# Patient Record
Sex: MALE | Race: WHITE
[De-identification: names, ages, dates, MRNs, and addresses within clinical notes are randomized per-mention and may not be internally consistent; named-entity substitution may affect disease eponyms.]

---

## 2021-07-27 ENCOUNTER — HOSPITAL ENCOUNTER (EMERGENCY)
Dept: HOSPITAL 11 - JP.ED | Age: 51
Discharge: HOME | End: 2021-07-27
Payer: COMMERCIAL

## 2021-07-27 DIAGNOSIS — Z23: ICD-10-CM

## 2021-07-27 DIAGNOSIS — Z88.0: ICD-10-CM
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
## 2021-07-27 DIAGNOSIS — S61.431A: Primary | ICD-10-CM

## 2021-07-27 NOTE — EDM.PDOC
ED HPI GENERAL MEDICAL PROBLEM





- General


Chief Complaint: Laceration


Stated Complaint: FISHHOOK RT HAND


Time Seen by Provider: 07/27/21 09:55


Source of Information: Reports: Patient


History Limitations: Reports: No Limitations





- History of Present Illness


INITIAL COMMENTS - FREE TEXT/NARRATIVE: 





51-year-old male who had a fishhook embedded into his right thenar area of the 

palm earlier this morning, it was removed by a family member and the bleeding is

controlled, just slight pain but he is in for tetanus vaccination.  No other 

complaint.


Onset: Sudden


Duration: Hour(s): (3 hours ago)


Location: Reports: Upper Extremity, Right


Associated Symptoms: Reports: No Other Symptoms





- Related Data


                                    Allergies











Allergy/AdvReac Type Severity Reaction Status Date / Time


 


Penicillins Allergy  Rash Verified 07/27/21 09:43











Home Meds: 


                                    Home Meds





buPROPion [Wellbutrin SR] 150 mg PO DAILY 07/27/21 [History]











Past Medical History


Other HEENT History: nasal


Psychiatric History: Reports: Anxiety, Depression





- Past Surgical History


Other Musculoskeletal Surgeries/Procedures:: back surgery





Social & Family History





- Tobacco Use


Tobacco Use Status *Q: Never Tobacco User





- Caffeine Use


Caffeine Use: Reports: None





- Recreational Drug Use


Recreational Drug Use: No





ED ROS GENERAL





- Review of Systems


Review Of Systems: See Below


Constitutional: Denies: Fever, Chills, Malaise


Respiratory: Reports: No Symptoms


Cardiovascular: Reports: No Symptoms


GI/Abdominal: Reports: No Symptoms


Skin: Reports: Other (2 tiny puncture wounds in the thenar area of the right 

hand)





ED EXAM, SKIN/RASH


Exam: See Below


Exam Limited By: No Limitations


General Appearance: Alert, No Apparent Distress


Head: Atraumatic


Respiratory/Chest: No Respiratory Distress


Extremities: Other (2 tiny puncture wounds are present in the thenar area of the

 right hand, no erythema, swelling or active bleeding)


Neurological: Alert, Oriented


Psychiatric: Normal Affect, Normal Mood





Course





- Vital Signs


Last Recorded V/S: 


                                Last Vital Signs











Temp  97.5 F   07/27/21 09:46


 


Pulse  47 L  07/27/21 09:46


 


Resp  16   07/27/21 09:46


 


BP  154/90 H  07/27/21 09:46


 


Pulse Ox  100   07/27/21 09:46














- Orders/Labs/Meds


Meds: 


Medications














Discontinued Medications














Generic Name Dose Route Start Last Admin





  Trade Name Freq  PRN Reason Stop Dose Admin


 


Bacitracin  1 dose  07/27/21 10:08  07/27/21 10:12





  Bacitracin Oint 1 Gm U/D Packet  TOP  07/27/21 10:09  1 dose





  ONETIME ONE   Administration


 


Diphtheria/Tetanus/Acell Pertussis  0.5 ml  07/27/21 10:08  07/27/21 10:12





  Diphtheria,Pertussis(Acell),Tetanus Vaccine 0.5 Ml Syringe  IM  07/27/21 10:09

  0.5 ml





  .ONCE ONE   Administration














- Re-Assessments/Exams


Free Text/Narrative Re-Assessment/Exam: 





07/27/21 10:15


Topical bacitracin was applied along with a Band-Aid, patient was given a Tdap 

booster and was sent on his way.  He can return if there is problems.





Departure





- Departure


Time of Disposition: 10:23


Disposition: Home, Self-Care 01


Clinical Impression: 


Puncture wound of right hand


Qualifiers:


 Encounter type: initial encounter Foreign body presence: without foreign body 

Qualified Code(s): S61.431A - Puncture wound without foreign body of right hand,

 initial encounter








- Discharge Information


Instructions:  Puncture Wound


Referrals: 


PCP,None [Primary Care Provider] - 


Forms:  ED Department Discharge


Care Plan Goals: 


Keep area covered and clean while healing, increase activity as tolerated and 

return if concerns of infection or not healing satisfactorily.





Sepsis Event Note (ED)





- Evaluation


Sepsis Screening Result: No Definite Risk





- Focused Exam


Vital Signs: 


                                   Vital Signs











  Temp Pulse Resp BP Pulse Ox


 


 07/27/21 09:46  97.5 F  47 L  16  154/90 H  100


 


 07/27/21 09:43  97.5 F  47 L  16  154/90 H  100